# Patient Record
Sex: FEMALE | Race: WHITE | NOT HISPANIC OR LATINO
[De-identification: names, ages, dates, MRNs, and addresses within clinical notes are randomized per-mention and may not be internally consistent; named-entity substitution may affect disease eponyms.]

---

## 2017-03-22 ENCOUNTER — APPOINTMENT (OUTPATIENT)
Dept: SURGICAL ONCOLOGY | Facility: CLINIC | Age: 47
End: 2017-03-22

## 2017-03-22 VITALS
SYSTOLIC BLOOD PRESSURE: 120 MMHG | BODY MASS INDEX: 29.83 KG/M2 | HEIGHT: 61 IN | WEIGHT: 158 LBS | DIASTOLIC BLOOD PRESSURE: 80 MMHG

## 2017-03-22 RX ORDER — TAMOXIFEN CITRATE 20 MG/1
20 TABLET, FILM COATED ORAL
Refills: 0 | Status: ACTIVE | COMMUNITY

## 2017-04-04 ENCOUNTER — FORM ENCOUNTER (OUTPATIENT)
Age: 47
End: 2017-04-04

## 2017-04-05 ENCOUNTER — OUTPATIENT (OUTPATIENT)
Dept: OUTPATIENT SERVICES | Facility: HOSPITAL | Age: 47
LOS: 1 days | End: 2017-04-05
Payer: COMMERCIAL

## 2017-04-05 ENCOUNTER — APPOINTMENT (OUTPATIENT)
Dept: ULTRASOUND IMAGING | Facility: CLINIC | Age: 47
End: 2017-04-05

## 2017-04-05 ENCOUNTER — APPOINTMENT (OUTPATIENT)
Dept: MAMMOGRAPHY | Facility: CLINIC | Age: 47
End: 2017-04-05

## 2017-04-05 DIAGNOSIS — Z00.8 ENCOUNTER FOR OTHER GENERAL EXAMINATION: ICD-10-CM

## 2017-04-05 PROCEDURE — G0279: CPT

## 2017-04-05 PROCEDURE — 76642 ULTRASOUND BREAST LIMITED: CPT

## 2017-04-05 PROCEDURE — 77065 DX MAMMO INCL CAD UNI: CPT

## 2017-10-04 ENCOUNTER — APPOINTMENT (OUTPATIENT)
Dept: SURGICAL ONCOLOGY | Facility: CLINIC | Age: 47
End: 2017-10-04
Payer: COMMERCIAL

## 2017-10-04 VITALS
BODY MASS INDEX: 29.83 KG/M2 | HEART RATE: 59 BPM | DIASTOLIC BLOOD PRESSURE: 87 MMHG | SYSTOLIC BLOOD PRESSURE: 131 MMHG | HEIGHT: 61 IN | WEIGHT: 158 LBS | OXYGEN SATURATION: 99 %

## 2017-10-04 DIAGNOSIS — N60.99 UNSPECIFIED BENIGN MAMMARY DYSPLASIA OF UNSPECIFIED BREAST: ICD-10-CM

## 2017-10-04 PROCEDURE — 99214 OFFICE O/P EST MOD 30 MIN: CPT

## 2017-12-07 ENCOUNTER — APPOINTMENT (OUTPATIENT)
Dept: MAMMOGRAPHY | Facility: CLINIC | Age: 47
End: 2017-12-07
Payer: COMMERCIAL

## 2017-12-07 ENCOUNTER — OUTPATIENT (OUTPATIENT)
Dept: OUTPATIENT SERVICES | Facility: HOSPITAL | Age: 47
LOS: 1 days | End: 2017-12-07
Payer: MEDICAID

## 2017-12-07 DIAGNOSIS — Z00.8 ENCOUNTER FOR OTHER GENERAL EXAMINATION: ICD-10-CM

## 2017-12-07 PROCEDURE — 77063 BREAST TOMOSYNTHESIS BI: CPT | Mod: 26

## 2017-12-07 PROCEDURE — G0202: CPT | Mod: 26

## 2017-12-07 PROCEDURE — 77063 BREAST TOMOSYNTHESIS BI: CPT

## 2017-12-07 PROCEDURE — 77067 SCR MAMMO BI INCL CAD: CPT

## 2018-04-04 ENCOUNTER — APPOINTMENT (OUTPATIENT)
Dept: SURGICAL ONCOLOGY | Facility: CLINIC | Age: 48
End: 2018-04-04

## 2025-02-25 ENCOUNTER — EMERGENCY (EMERGENCY)
Facility: HOSPITAL | Age: 55
LOS: 0 days | Discharge: HOME | End: 2025-02-25
Attending: EMERGENCY MEDICINE
Payer: COMMERCIAL

## 2025-02-25 ENCOUNTER — TRANSCRIPTION ENCOUNTER (OUTPATIENT)
Age: 55
End: 2025-02-25

## 2025-02-25 VITALS
SYSTOLIC BLOOD PRESSURE: 108 MMHG | RESPIRATION RATE: 18 BRPM | DIASTOLIC BLOOD PRESSURE: 71 MMHG | OXYGEN SATURATION: 98 % | HEART RATE: 66 BPM

## 2025-02-25 VITALS
DIASTOLIC BLOOD PRESSURE: 60 MMHG | OXYGEN SATURATION: 100 % | SYSTOLIC BLOOD PRESSURE: 100 MMHG | TEMPERATURE: 98 F | HEART RATE: 63 BPM | WEIGHT: 126.1 LBS | RESPIRATION RATE: 18 BRPM

## 2025-02-25 LAB
ALBUMIN SERPL ELPH-MCNC: 3.9 G/DL — SIGNIFICANT CHANGE UP (ref 3.3–5)
ALP SERPL-CCNC: 90 U/L — SIGNIFICANT CHANGE UP (ref 40–120)
ALT FLD-CCNC: 20 U/L — SIGNIFICANT CHANGE UP (ref 12–78)
ANION GAP SERPL CALC-SCNC: 5 MMOL/L — SIGNIFICANT CHANGE UP (ref 5–17)
AST SERPL-CCNC: 11 U/L — LOW (ref 15–37)
BASOPHILS # BLD AUTO: 0.04 K/UL — SIGNIFICANT CHANGE UP (ref 0–0.2)
BASOPHILS NFR BLD AUTO: 0.8 % — SIGNIFICANT CHANGE UP (ref 0–2)
BILIRUB SERPL-MCNC: 0.3 MG/DL — SIGNIFICANT CHANGE UP (ref 0.2–1.2)
BUN SERPL-MCNC: 15 MG/DL — SIGNIFICANT CHANGE UP (ref 7–23)
CALCIUM SERPL-MCNC: 9.7 MG/DL — SIGNIFICANT CHANGE UP (ref 8.5–10.1)
CHLORIDE SERPL-SCNC: 108 MMOL/L — SIGNIFICANT CHANGE UP (ref 96–108)
CO2 SERPL-SCNC: 26 MMOL/L — SIGNIFICANT CHANGE UP (ref 22–31)
CREAT SERPL-MCNC: 1 MG/DL — SIGNIFICANT CHANGE UP (ref 0.5–1.3)
EGFR: 66 ML/MIN/1.73M2 — SIGNIFICANT CHANGE UP
EOSINOPHIL # BLD AUTO: 0.09 K/UL — SIGNIFICANT CHANGE UP (ref 0–0.5)
EOSINOPHIL NFR BLD AUTO: 1.8 % — SIGNIFICANT CHANGE UP (ref 0–6)
GLUCOSE SERPL-MCNC: 111 MG/DL — HIGH (ref 70–99)
HCT VFR BLD CALC: 38.8 % — SIGNIFICANT CHANGE UP (ref 34.5–45)
HGB BLD-MCNC: 12.6 G/DL — SIGNIFICANT CHANGE UP (ref 11.5–15.5)
IMM GRANULOCYTES # BLD AUTO: 0.02 K/UL — SIGNIFICANT CHANGE UP (ref 0–0.07)
IMM GRANULOCYTES NFR BLD AUTO: 0.4 % — SIGNIFICANT CHANGE UP (ref 0–0.9)
LYMPHOCYTES # BLD AUTO: 1.69 K/UL — SIGNIFICANT CHANGE UP (ref 1–3.3)
LYMPHOCYTES NFR BLD AUTO: 33.6 % — SIGNIFICANT CHANGE UP (ref 13–44)
MCHC RBC-ENTMCNC: 29.6 PG — SIGNIFICANT CHANGE UP (ref 27–34)
MCHC RBC-ENTMCNC: 32.5 G/DL — SIGNIFICANT CHANGE UP (ref 32–36)
MCV RBC AUTO: 91.3 FL — SIGNIFICANT CHANGE UP (ref 80–100)
MONOCYTES # BLD AUTO: 0.36 K/UL — SIGNIFICANT CHANGE UP (ref 0–0.9)
MONOCYTES NFR BLD AUTO: 7.2 % — SIGNIFICANT CHANGE UP (ref 2–14)
NEUTROPHILS # BLD AUTO: 2.83 K/UL — SIGNIFICANT CHANGE UP (ref 1.8–7.4)
NEUTROPHILS NFR BLD AUTO: 56.2 % — SIGNIFICANT CHANGE UP (ref 43–77)
NRBC # BLD AUTO: 0 K/UL — SIGNIFICANT CHANGE UP (ref 0–0)
NRBC # FLD: 0 K/UL — SIGNIFICANT CHANGE UP (ref 0–0)
NRBC BLD AUTO-RTO: 0 /100 WBCS — SIGNIFICANT CHANGE UP (ref 0–0)
PLATELET # BLD AUTO: 230 K/UL — SIGNIFICANT CHANGE UP (ref 150–400)
PMV BLD: 10.4 FL — SIGNIFICANT CHANGE UP (ref 7–13)
POTASSIUM SERPL-MCNC: 3.9 MMOL/L — SIGNIFICANT CHANGE UP (ref 3.5–5.3)
POTASSIUM SERPL-SCNC: 3.9 MMOL/L — SIGNIFICANT CHANGE UP (ref 3.5–5.3)
PROT SERPL-MCNC: 6.8 GM/DL — SIGNIFICANT CHANGE UP (ref 6–8.3)
RBC # BLD: 4.25 M/UL — SIGNIFICANT CHANGE UP (ref 3.8–5.2)
RBC # FLD: 12.2 % — SIGNIFICANT CHANGE UP (ref 10.3–14.5)
SODIUM SERPL-SCNC: 139 MMOL/L — SIGNIFICANT CHANGE UP (ref 135–145)
WBC # BLD: 5.03 K/UL — SIGNIFICANT CHANGE UP (ref 3.8–10.5)
WBC # FLD AUTO: 5.03 K/UL — SIGNIFICANT CHANGE UP (ref 3.8–10.5)

## 2025-02-25 PROCEDURE — 73600 X-RAY EXAM OF ANKLE: CPT | Mod: RT

## 2025-02-25 PROCEDURE — 90471 IMMUNIZATION ADMIN: CPT

## 2025-02-25 PROCEDURE — 73630 X-RAY EXAM OF FOOT: CPT | Mod: RT

## 2025-02-25 PROCEDURE — 93010 ELECTROCARDIOGRAM REPORT: CPT

## 2025-02-25 PROCEDURE — 90715 TDAP VACCINE 7 YRS/> IM: CPT

## 2025-02-25 PROCEDURE — 93005 ELECTROCARDIOGRAM TRACING: CPT

## 2025-02-25 PROCEDURE — 80053 COMPREHEN METABOLIC PANEL: CPT

## 2025-02-25 PROCEDURE — 99284 EMERGENCY DEPT VISIT MOD MDM: CPT | Mod: 25

## 2025-02-25 PROCEDURE — 73600 X-RAY EXAM OF ANKLE: CPT | Mod: 26,RT

## 2025-02-25 PROCEDURE — 85025 COMPLETE CBC W/AUTO DIFF WBC: CPT

## 2025-02-25 PROCEDURE — 12013 RPR F/E/E/N/L/M 2.6-5.0 CM: CPT

## 2025-02-25 PROCEDURE — 96374 THER/PROPH/DIAG INJ IV PUSH: CPT | Mod: XU

## 2025-02-25 PROCEDURE — 99285 EMERGENCY DEPT VISIT HI MDM: CPT | Mod: 25

## 2025-02-25 PROCEDURE — 36415 COLL VENOUS BLD VENIPUNCTURE: CPT

## 2025-02-25 PROCEDURE — 73630 X-RAY EXAM OF FOOT: CPT | Mod: 26,RT

## 2025-02-25 RX ORDER — CLOSTRIDIUM TETANI TOXOID ANTIGEN (FORMALDEHYDE INACTIVATED), CORYNEBACTERIUM DIPHTHERIAE TOXOID ANTIGEN (FORMALDEHYDE INACTIVATED), BORDETELLA PERTUSSIS TOXOID ANTIGEN (GLUTARALDEHYDE INACTIVATED), BORDETELLA PERTUSSIS FILAMENTOUS HEMAGGLUTININ ANTIGEN (FORMALDEHYDE INACTIVATED), BORDETELLA PERTUSSIS PERTACTIN ANTIGEN, AND BORDETELLA PERTUSSIS FIMBRIAE 2/3 ANTIGEN 5; 2; 2.5; 5; 3; 5 [LF]/.5ML; [LF]/.5ML; UG/.5ML; UG/.5ML; UG/.5ML; UG/.5ML
0.5 INJECTION, SUSPENSION INTRAMUSCULAR ONCE
Refills: 0 | Status: COMPLETED | OUTPATIENT
Start: 2025-02-25 | End: 2025-02-25

## 2025-02-25 RX ORDER — BACTERIOSTATIC SODIUM CHLORIDE 0.9 %
1000 VIAL (ML) INJECTION ONCE
Refills: 0 | Status: COMPLETED | OUTPATIENT
Start: 2025-02-25 | End: 2025-02-25

## 2025-02-25 RX ORDER — ACETAMINOPHEN 160 MG/5ML
1000 SUSPENSION ORAL ONCE
Refills: 0 | Status: COMPLETED | OUTPATIENT
Start: 2025-02-25 | End: 2025-02-25

## 2025-02-25 RX ADMIN — ACETAMINOPHEN 400 MILLIGRAM(S): 160 SUSPENSION ORAL at 08:37

## 2025-02-25 RX ADMIN — Medication 2000 MILLILITER(S): at 08:37

## 2025-02-25 RX ADMIN — CLOSTRIDIUM TETANI TOXOID ANTIGEN (FORMALDEHYDE INACTIVATED), CORYNEBACTERIUM DIPHTHERIAE TOXOID ANTIGEN (FORMALDEHYDE INACTIVATED), BORDETELLA PERTUSSIS TOXOID ANTIGEN (GLUTARALDEHYDE INACTIVATED), BORDETELLA PERTUSSIS FILAMENTOUS HEMAGGLUTININ ANTIGEN (FORMALDEHYDE INACTIVATED), BORDETELLA PERTUSSIS PERTACTIN ANTIGEN, AND BORDETELLA PERTUSSIS FIMBRIAE 2/3 ANTIGEN 0.5 MILLILITER(S): 5; 2; 2.5; 5; 3; 5 INJECTION, SUSPENSION INTRAMUSCULAR at 08:37

## 2025-02-25 NOTE — ED PROVIDER NOTE - CLINICAL SUMMARY MEDICAL DECISION MAKING FREE TEXT BOX
Patient with syncopal episode in the setting of not sleeping and first time taking trazodone, likely med side effect, will check EKG, patient also complaining of right foot pain status post fall will x-ray rule out fracture.  Patient also with a linear forehead laceration will repair.  Will update tetanus.  [place] head CT criteria negative no need for CT at this time.

## 2025-02-25 NOTE — ED PROVIDER NOTE - PHYSICAL EXAMINATION
GEN - NAD; well appearing; A+O x3  HEAD -forehead laceration linear vertical 3.5 cm  EYES - EOMI, no conjunctival pallor, no scleral icterus  ENT -   mucous membranes  moist , no discharge  PULM - CTA b/l,  symmetric breath sounds  COR -  RRR, S1 S2, no murmurs  ABD - ND, NT, soft, no guarding, no rebound, no masses    EXTREMS -tenderness base the fifth metatarsal  SKIN - no rash or bruising      NEUROLOGIC - alert, sensation nl, motor 5/5 RUE/LUE/RLE/LLE

## 2025-02-25 NOTE — ED ADULT NURSE NOTE - OBJECTIVE STATEMENT
pt presents A&O x 4  s/p syncopal episode. pt states she woke up this morning was in the kitchen making coffee and fell +headstrike. - AC. Laceration noted to clare approx 5cm Bleeding controlled,  patient states she started taking Trazadone after a change from ambien,  pt also having right lateral side foot pain mild swelling noted

## 2025-02-25 NOTE — ED PROVIDER NOTE - NSFOLLOWUPINSTRUCTIONS_ED_ALL_ED_FT
Please return in 5 days for suture removal.  Please keep area clean and dry.  Please use crutches at all times.  Please take ibuprofen or Tylenol for pain.  Return to ED if worsening headache redness around wound or other concerning symptoms.

## 2025-02-25 NOTE — ED ADULT NURSE NOTE - NSFALLRISKINTERV_ED_ALL_ED

## 2025-02-25 NOTE — ED PROVIDER NOTE - CARE PROVIDER_API CALL
Jae High  Podiatric Surgery  [address] [facility], [address] [place] [address]  Phone: [phone]  Fax: [phone]  Follow Up Time:

## 2025-02-25 NOTE — ED ADULT TRIAGE NOTE - CHIEF COMPLAINT QUOTE
pt presents to ED AO x 4 s/p syncopal episode. pt was standing. +headstrike. - AC. Laceration noted to clare. Bleeding controlled in triage. pt reports she started taking Trazadone last night and has felt dizziness since. Denies nausea/vomiting, headache, blurry vision. Speech clear. Negative facial droop. No unilateral weakness. Also c/o R foot pain. Able to bear weight. - PMH. Allergy to penicillin. Sent for STAT EKG

## 2025-02-25 NOTE — CONSULT NOTE ADULT - SUBJECTIVE AND OBJECTIVE BOX
Date of consult: 2/25/2025    HPI: 54yr old female presented to ED today due to syncopal episode in the morning. Pt states she started taking new medication yesterday (Trazadone 50mg) and has been feeling dizzy since then. Pt also endorses to mild pain to the right foot lateral midfoot. Xrays revealed fx of the fifth met shaft. Podiatry consulted due to xray findings of fifth met shaft fx. Pt states pain is in control to the Rt foot, she is able to move all her toes, denies any other pedal complaints.     PMH:   PSH:    Allergies:penicillins (Other (Moderate))      Labs:                          12.6   5.03  )-----------( 230      ( 25 Feb 2025 07:38 )             38.8     WBC Trend  5.03 Date (02-25 @ 07:38)      Chem  02-25    139  |  108  |  15  ----------------------------<  111[H]  3.9   |  26  |  1.00    Ca    9.7      25 Feb 2025 07:38    TPro  6.8  /  Alb  3.9  /  TBili  0.3  /  DBili  x   /  AST  11[L]  /  ALT  20  /  AlkPhos  90  02-25    T(F): 98.2 (02-25-25 @ 07:00), Max: 98.2 (02-25-25 @ 07:00)  HR: 63 (02-25-25 @ 07:00) (63 - 63)  BP: 97/69 (02-25-25 @ 07:24) (97/69 - 100/60)  RR: 18 (02-25-25 @ 07:00) (18 - 18)  SpO2: 100% (02-25-25 @ 07:00) (100% - 100%)  Wt(kg): --    REVIEW OF SYSTEMS:    CONSTITUTIONAL: No weakness, fevers or chills  EYES: No visual changes  RESPIRATORY: No cough, wheezing; No shortness of breath  CARDIOVASCULAR: No chest pain or palpitations  GASTROINTESTINAL: No abdominal or epigastric pain. No nausea, vomiting; No diarrhea or constipation.   GENITOURINARY: No dysuria, frequency or hematuria  NEUROLOGICAL: No numbness or weakness  SKIN: See physical examination.  All other review of systems is negative unless indicated above    Physical Exam:   Constitutional: NAD, alert;  Lower Extremity Focus  Derm:  Skin warm, dry and supple bilateral. No open lesions noted, mild edema noted to the Rt midfoot.  Vascular: Dorsalis Pedis and Posterior Tibial pulses 2/4.  Capillary refill time less than 3 seconds digits 1-5 bilateral.  Neuro: Protective sensation intact to the level of the digits bilateral.  MSK: Muscle strength 5/5 all major muscle groups bilateral; Pain on palpation on the at the base of the 5th metatarsal base. .mild  erythema, Mild edema noted. No open lesions noted.

## 2025-02-25 NOTE — ED PROVIDER NOTE - OBJECTIVE STATEMENT
54-year-old female without significant past medical history presents status post syncopal episode.  Patient states she got up to go to work and felt lightheaded and fainted.  Patient states she did not sleep last night and tried a new sleeping medication prior to bed.  Patient states she usually takes Ambien but her PCP wanted to try her on trazodone.  Patient think she was finally getting sleepy just before it was time to wake up.  Patient states she has been in her usual state of health.  Unknown last tetanus.  Now complaining of right foot pain.

## 2025-02-25 NOTE — ED PROVIDER NOTE - PATIENT PORTAL LINK FT
You can access the FollowMyHealth Patient Portal offered by [facility] by registering at the following website: [email or web address]. By joining [employer]’s FollowMyHealth portal, you will also be able to view your health information using other applications (apps) compatible with our system.

## 2025-02-25 NOTE — CONSULT NOTE ADULT - ASSESSMENT
A: 54yr old female seen for the following:  -Rt foot non displaced closed fracture of the fifth metatarsal shaft  -Rt foot pain  -Difficulty ambulating    P:   Chart reviewed and Patient evaluated;  Discussed diagnosis and treatment with patient  X-rays reviewed : on wet read showing minimally displaced Rt foot fifth met shaft fracture  Applied soft compressive dressing along with posterior splint to the RLE  Dressings and splint to remain clean, dry and intact  Non weight bearing to the RLE; Dispensed crutches for gait assistance  RICE therapy to the RLE  Pt to f/u with Dr High with in 1 week of discharge   All additional care per ED appreciated  Patient demonstrated verbal understanding of all interventions and tolerated interventions well without any complications.   Case D/W attending Dr. High

## 2025-02-26 DIAGNOSIS — Z88.0 ALLERGY STATUS TO PENICILLIN: ICD-10-CM
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
## 2025-02-26 DIAGNOSIS — Z23 ENCOUNTER FOR IMMUNIZATION: ICD-10-CM

## 2025-02-26 DIAGNOSIS — R55 SYNCOPE AND COLLAPSE: ICD-10-CM

## 2025-02-26 DIAGNOSIS — S01.81XA LACERATION WITHOUT FOREIGN BODY OF OTHER PART OF HEAD, INITIAL ENCOUNTER: ICD-10-CM

## 2025-02-26 DIAGNOSIS — S92.351A DISPLACED FRACTURE OF FIFTH METATARSAL BONE, RIGHT FOOT, INITIAL ENCOUNTER FOR CLOSED FRACTURE: ICD-10-CM
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
